# Patient Record
Sex: FEMALE | Race: WHITE | NOT HISPANIC OR LATINO | URBAN - METROPOLITAN AREA
[De-identification: names, ages, dates, MRNs, and addresses within clinical notes are randomized per-mention and may not be internally consistent; named-entity substitution may affect disease eponyms.]

---

## 2017-03-16 ENCOUNTER — EMERGENCY (EMERGENCY)
Facility: HOSPITAL | Age: 47
LOS: 1 days | Discharge: HOME | End: 2017-03-16
Attending: EMERGENCY MEDICINE | Admitting: EMERGENCY MEDICINE
Payer: COMMERCIAL

## 2017-03-16 VITALS
TEMPERATURE: 98 F | HEIGHT: 68 IN | OXYGEN SATURATION: 96 % | DIASTOLIC BLOOD PRESSURE: 92 MMHG | WEIGHT: 229.94 LBS | RESPIRATION RATE: 16 BRPM | HEART RATE: 87 BPM | SYSTOLIC BLOOD PRESSURE: 145 MMHG

## 2017-03-16 VITALS
OXYGEN SATURATION: 98 % | SYSTOLIC BLOOD PRESSURE: 140 MMHG | HEART RATE: 85 BPM | DIASTOLIC BLOOD PRESSURE: 80 MMHG | RESPIRATION RATE: 14 BRPM | TEMPERATURE: 98 F

## 2017-03-16 DIAGNOSIS — Z91.018 ALLERGY TO OTHER FOODS: ICD-10-CM

## 2017-03-16 DIAGNOSIS — Z21 ASYMPTOMATIC HUMAN IMMUNODEFICIENCY VIRUS [HIV] INFECTION STATUS: ICD-10-CM

## 2017-03-16 DIAGNOSIS — M43.6 TORTICOLLIS: ICD-10-CM

## 2017-03-16 PROCEDURE — 99282 EMERGENCY DEPT VISIT SF MDM: CPT

## 2017-03-16 PROCEDURE — 99283 EMERGENCY DEPT VISIT LOW MDM: CPT

## 2017-03-16 RX ORDER — NEVIRAPINE 400 MG/1
1 TABLET, EXTENDED RELEASE ORAL
Qty: 0 | Refills: 0 | COMMUNITY

## 2017-03-16 RX ORDER — DIAZEPAM 5 MG
1 TABLET ORAL
Qty: 6 | Refills: 0 | OUTPATIENT
Start: 2017-03-16 | End: 2017-03-18

## 2017-03-16 RX ORDER — EMTRICITABINE AND TENOFOVIR DISOPROXIL FUMARATE 200; 300 MG/1; MG/1
1 TABLET, FILM COATED ORAL
Qty: 0 | Refills: 0 | COMMUNITY

## 2017-03-16 NOTE — ED ADULT NURSE NOTE - PMH
Arthritis    HIV (human immunodeficiency virus infection)    HIV (human immunodeficiency virus) risk factors complicating pregnancy

## 2017-03-16 NOTE — ED PROVIDER NOTE - CONSTITUTIONAL, MLM
normal... Well appearing, well nourished, awake, alert, oriented to person, place, time/situation and in  mild pain, torticollis

## 2017-03-16 NOTE — ED PROVIDER NOTE - MEDICAL DECISION MAKING DETAILS
pt is a 47 yo female with acute torticollis and neck pain, neuro intact.  2 dasy pain meds, valium medrol pack, fu spine

## 2017-03-16 NOTE — ED PROVIDER NOTE - MUSCULOSKELETAL, MLM
no bony ttp, left scm with spasm and ttp reproducing pain, limited rotation to left, left arm with full strenght throuhout, sm intact, 2+ pulse

## 2017-03-16 NOTE — ED PROVIDER NOTE - OBJECTIVE STATEMENT
pt is a 45 yo female with neck pain over past 6 months with flares about every 6 weeks. pt since yesterday with left neck pain worse with twisting and pain lifting left arm. no weakness, f/c alex or bladder incontinence.  no trauma

## 2017-03-16 NOTE — ED ADULT TRIAGE NOTE - CHIEF COMPLAINT QUOTE
"I have arthritis in my neck that gets inflamed at times. I was here 2 months ago for a CT and I didn't follow up with the spine specialist. I've had pain and stiffness since 3/14 that's gotten worse, I can't turn it, it hurts worse turning to the left than right. My shoulder hurts too but they said it was fine last time"

## 2021-04-03 NOTE — ED PROVIDER NOTE - CONTEXT
unknown pt p/w shortness of breath diagnosed covid + 3/24. c/o fatigue and cough since diagnosis however, noted worsening SOB x 2-3 days. Denies n/v/d

## 2023-11-24 NOTE — ED PROVIDER NOTE - CPE EDP ENMT NORM
Take Bumex 1mg (1 tab) twice a day  Take Potassium 20mEq (1 tab) twice a day on Monday through Friday       Take Potassium 20mEq (1 tab) once a day on Saturday and  Sunday  3. Adhere to a low sodium diet 600 mg/meal and fluid restriction of 64 oz/day.  4. Take your medications as instructed. Please DO NOT stop taking medication without specific instructions from your physician.  5. Call HF clinic  for weight gain of 2-3 lbs in a day, increased shortness of breath swelling or for any change of condition before your next appointment.  
normal...

## 2025-07-11 NOTE — ED ADULT TRIAGE NOTE - NSWEIGHTCALCTOOLDRUG_GEN_A_CORE
SUBJECTIVE  Mabel Robertson is a 58 year old  female who presents today for a follow up on:   DM2 - trying to watch diet, tolerating medication without side effects.  HTN - no CP or SOB.  No headaches, vision changes or dizziness.  Hyperlipidemia - tolerating medication without side effects.          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 90 tablet 1   • carvedilol (COREG) 12.5 MG tablet Take 1 tablet by mouth in the morning and 1 tablet in the evening. 180 tablet 1   • Cholecalciferol (Vitamin D3) 50 mcg (2,000 units) capsule Take 1 capsule by mouth daily. 90 capsule 1   • cloNIDine (CATAPRES) 0.1 MG tablet Take 1 tablet by mouth in the morning and 1 tablet in the evening. 180 tablet 1   • metformin (GLUCOPHAGE) 1000 MG tablet Take 1 tablet by mouth in the morning and 1 tablet in the evening. Take with meals. 180 tablet 1   • simvastatin (ZOCOR) 20 MG tablet Take 1 tablet by mouth nightly. 90 tablet 1   • spironolactone (ALDACTONE) 25 MG tablet Take 0.5 tablets by mouth daily. 45 tablet 1   • mupirocin (BACTROBAN) 2 % ointment Apply topically 3 times daily as needed (rash). 22 g 0   • empagliflozin  (Jardiance) 25 MG tablet Take 1 tablet by mouth daily (before breakfast). 90 tablet 1   • glimepiride (AMARYL) 1 MG tablet Take 1 tablet by mouth daily (before breakfast). 90 tablet 1   • blood glucose test strip Test blood sugar 1 times daily. Diagnosis: DM type 2 Meter: Eron Contour Next  strip 3   • Microlet Lancets Misc Test BG 1 time a day 100 each 3   • cetirizine (ZyrTEC) 10 MG tablet Every Day as needed for Stuffy Nose     • blood glucose lancets ONE TOUCH Test 2X dailyDx: DM TYPE II UNCONTR UNCOMP E11.65     • blood glucose test strip ONE TOUCH Test 2X dailyDx: DM TYPE II UNCONTR UNCOMP E11.65     • blood glucose meter ONE TOUCH Test 2X dailyDx: DM TYPE II UNCONTR UNCOMP E11.65     • EPINEPHrine 0.3 MG/0.3ML auto-injector As directed       Current Facility-Administered Medications   Medication Dose Route Frequency Provider Last Rate Last Admin   • lidocaine 1 % injection 100 mg  10 mL Subcutaneous Once Matthew Medina MD             ROS:  As per above, otherwise:  General: Patient denies fever, chills, night sweats, weight changes, or appetite changes  Respiratory: No coughing, wheezing, changes in voice, no shortness of breath  Cardiovascular:No chest pain, palpitations or other cardiac complaints noted  Gastrointestinal: No diarrhea, constipation, abdominal pain or other complaints noted  Endocrine: DIABETES    OBJECTIVE:  Visit Vitals  /70   Pulse 83   Temp 97.6 °F (36.4 °C) (Temporal)   Resp 18   Ht 5' (1.524 m)   Wt 110.2 kg (243 lb)   SpO2 94%   BMI 47.46 kg/m²     [facility]'s BMI is Body mass index is 47.46 kg/m².   Physical exam   General appearance - alert & oriented, pleasant and comfortable  Heart - RRR w/o S3, S4, or murmurs.  The PMI is not displaced.  There is no JVD  Lungs - CTA throughout without crackles, rhonchi, or wheezes.  Patient has good air exchange without pleuritic symptoms.    Lab results reviewed with patient.      ASSESSMENT/PLAN    Current problems:  Type 2  diabetes mellitus without complication, without long-term current use of insulin (CMD)  (primary encounter diagnosis)  Class 3 severe obesity due to excess calories with serious comorbidity and body mass index (BMI) of 45.0 to 49.9 in adult  Hyperlipidemia associated with type 2 diabetes mellitus  (CMD)  Primary hypertension  Renal cell carcinoma of left kidney  (CMD)      (1) DM2, obesity with BMI 47.46 - A1C at goal - continue current medications per endocrinology, has diabetic eye exam scheduled for later this month.    (2) Hyperlipidemia - continue statin, check lipids at next visit.    (3) HTN - well controlled - refills ordered today.    (4) Renal cell CA, L kidney - encouraged patient to keep follow up with urology as scheduled.    Instructed to call if the problem worsens or does not improve.  Complete Physical Exam in 6 months.    Electronically Signed by: Mimi Smith DO 7/11/2025        used